# Patient Record
Sex: FEMALE | URBAN - METROPOLITAN AREA
[De-identification: names, ages, dates, MRNs, and addresses within clinical notes are randomized per-mention and may not be internally consistent; named-entity substitution may affect disease eponyms.]

---

## 2022-09-27 ENCOUNTER — WEB ENCOUNTER (OUTPATIENT)
Dept: URBAN - METROPOLITAN AREA CLINIC 115 | Facility: CLINIC | Age: 42
End: 2022-09-27

## 2022-09-28 ENCOUNTER — CLAIMS CREATED FROM THE CLAIM WINDOW (OUTPATIENT)
Dept: URBAN - METROPOLITAN AREA CLINIC 115 | Facility: CLINIC | Age: 42
End: 2022-09-28
Payer: COMMERCIAL

## 2022-09-28 ENCOUNTER — WEB ENCOUNTER (OUTPATIENT)
Dept: URBAN - METROPOLITAN AREA CLINIC 115 | Facility: CLINIC | Age: 42
End: 2022-09-28

## 2022-09-28 VITALS
BODY MASS INDEX: 32.43 KG/M2 | SYSTOLIC BLOOD PRESSURE: 121 MMHG | DIASTOLIC BLOOD PRESSURE: 71 MMHG | TEMPERATURE: 98.2 F | HEART RATE: 74 BPM | HEIGHT: 63 IN | WEIGHT: 183 LBS

## 2022-09-28 DIAGNOSIS — R79.89 ABNORMAL LIVER FUNCTION TEST: ICD-10-CM

## 2022-09-28 DIAGNOSIS — K76.0 FATTY LIVER: ICD-10-CM

## 2022-09-28 PROBLEM — 197321007: Status: ACTIVE | Noted: 2022-09-28

## 2022-09-28 PROCEDURE — 99204 OFFICE O/P NEW MOD 45 MIN: CPT | Performed by: INTERNAL MEDICINE

## 2022-09-28 PROCEDURE — 99244 OFF/OP CNSLTJ NEW/EST MOD 40: CPT | Performed by: INTERNAL MEDICINE

## 2022-09-28 RX ORDER — OMEGA-3 FATTY ACIDS/FISH OIL 300-1000MG
1 CAPSULE CAPSULE ORAL ONCE A DAY
Status: ACTIVE | COMMUNITY

## 2022-09-28 NOTE — HPI-TODAY'S VISIT:
41-year-old female patient was seen today for evaluation of abnormal liver enzymes and fatty liver.  Patient stated she went for routine office visit was noted to have AST and /60.  Patient reports viral hepatitis was negative.  Reports having drinking alcohol 2 to 3 cups of elissa on the weekends.  Denies any drinking alcohol during the weekends.  Weekdays.  Patient denies any chest pain shortness of breath.  Ultrasound of the abdomen showed normal gallbladder however fatty liver was noted.  Ultrasound showed fatty liver and she is on medications gabapentin for pain.  Patient denies any heavy NSAID use use recently.  Denies any family history of liver disease.

## 2022-10-01 LAB
(TTG) AB, IGA: <1
(TTG) AB, IGG: <1
ACTIN (SMOOTH MUSCLE) ANTIBODY (IGG): <20
ALBUMIN: 4.3
ALT: 124
ANA SCREEN, IFA: NEGATIVE
ANTIGLIADIN ABS, IGA: <1
AST: 67
CALCULATED BMI: 32.2
DIABETES: NO
GLIADIN (DEAMIDATED) AB (IGA): <1
GLIADIN (DEAMIDATED) AB (IGG): <1
GLUCOSE, SERUM: 89
HEIGHT FEET: 3
HEPATITIS A AB, TOTAL: REACTIVE
HEPATITIS B SURFACE AB IMMUNITY, QN: <5
IMMUNOGLOBULIN A: 220
INTERPRETATION: (no result)
MITOCHONDRIAL (M2) ANTIBODY: <=20
NAFLD FIBROSIS SCORE: -2.71
PLATELET COUNT: 249
T-TRANSGLUTAMINASE (TTG) IGA: <1
WEIGHT: 182

## 2022-10-12 ENCOUNTER — TELEPHONE ENCOUNTER (OUTPATIENT)
Dept: URBAN - METROPOLITAN AREA CLINIC 115 | Facility: CLINIC | Age: 42
End: 2022-10-12

## 2022-11-15 ENCOUNTER — WEB ENCOUNTER (OUTPATIENT)
Dept: URBAN - METROPOLITAN AREA CLINIC 82 | Facility: CLINIC | Age: 42
End: 2022-11-15

## 2022-11-18 ENCOUNTER — OFFICE VISIT (OUTPATIENT)
Dept: URBAN - METROPOLITAN AREA CLINIC 82 | Facility: CLINIC | Age: 42
End: 2022-11-18
Payer: COMMERCIAL

## 2022-11-18 ENCOUNTER — DASHBOARD ENCOUNTERS (OUTPATIENT)
Age: 42
End: 2022-11-18

## 2022-11-18 VITALS
HEIGHT: 63 IN | WEIGHT: 181.8 LBS | BODY MASS INDEX: 32.21 KG/M2 | SYSTOLIC BLOOD PRESSURE: 126 MMHG | DIASTOLIC BLOOD PRESSURE: 81 MMHG | HEART RATE: 68 BPM | TEMPERATURE: 97.5 F

## 2022-11-18 DIAGNOSIS — K76.0 NAFLD (NONALCOHOLIC FATTY LIVER DISEASE): ICD-10-CM

## 2022-11-18 DIAGNOSIS — E66.9 OBESITY (BMI 30.0-34.9): ICD-10-CM

## 2022-11-18 DIAGNOSIS — R79.89 ABNORMAL LFTS (LIVER FUNCTION TESTS): ICD-10-CM

## 2022-11-18 PROBLEM — 197315008: Status: ACTIVE | Noted: 2022-11-18

## 2022-11-18 PROBLEM — 166603001: Status: ACTIVE | Noted: 2022-11-18

## 2022-11-18 PROBLEM — 443371000124107: Status: ACTIVE | Noted: 2022-11-18

## 2022-11-18 PROCEDURE — 99212 OFFICE O/P EST SF 10 MIN: CPT | Performed by: INTERNAL MEDICINE

## 2022-11-18 RX ORDER — OMEGA-3 FATTY ACIDS/FISH OIL 300-1000MG
1 CAPSULE CAPSULE ORAL ONCE A DAY
Status: ACTIVE | COMMUNITY

## 2022-11-18 NOTE — HPI-TODAY'S VISIT:
41 y/o  female with obesity,liver steatosis on sonogram that came for routine f/up given elevated LFT's sionce early this year. She use to drink alcohol ocasionally but stop two months ago.Viral hep panel was negative.Liver enzymes were 2X ULN. ASt 67. FIB-4 WNL.No clinical manifestation of liver disease. ASMA and AMA are negative.Immunity to viral hep A ,low hep B surface antibody titers need hep b booster.She is trying to loss some weight.She decrease 2 pounds since last visit.No family hx of liver disease. Refer she use Azithromycin and ciprofloxacin over the last 6 months for URTI and UTI respectivelly.

## 2022-11-23 LAB
ALBUMIN/GLOBULIN RATIO: 1.7
ALBUMIN: 4.3
ALKALINE PHOSPHATASE: 41
ALT (SGPT): 52
AST (SGOT): 26
BILIRUBIN, DIRECT: 0.1
BILIRUBIN, INDIRECT: 0.4
BILIRUBIN, TOTAL: 0.5
GGT: 19
GLOBULIN: 2.6
HEMOGLOBIN A1C: 5
IMMUNOGLOBULIN G, QN, SERUM: 1209
LKM-1 ANTIBODY (IGG): <=20
PROTEIN, TOTAL: 6.9

## 2022-11-28 ENCOUNTER — TELEPHONE ENCOUNTER (OUTPATIENT)
Dept: URBAN - METROPOLITAN AREA CLINIC 82 | Facility: CLINIC | Age: 42
End: 2022-11-28

## 2023-06-05 ENCOUNTER — OFFICE VISIT (OUTPATIENT)
Dept: URBAN - METROPOLITAN AREA CLINIC 82 | Facility: CLINIC | Age: 43
End: 2023-06-05